# Patient Record
(demographics unavailable — no encounter records)

---

## 2024-12-19 NOTE — HISTORY OF PRESENT ILLNESS
[FreeTextEntry1] : eczema [de-identified] : 19F with a longstanding hx of eczema here for f/u. Has been on dupixent since 9/2021 with good control.  Uses alclometasone for breakthough disease, but last flare was 8/2023. has some conjunctivitis that see optho for, no drops prescribed. 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

## 2024-12-19 NOTE — PHYSICAL EXAM
[Alert] : alert [Oriented x 3] : ~L oriented x 3 [Well Nourished] : well nourished [Conjunctiva Non-injected] : conjunctiva non-injected [No Visual Lymphadenopathy] : no visual  lymphadenopathy [No Clubbing] : no clubbing [No Edema] : no edema [No Bromhidrosis] : no bromhidrosis [No Chromhidrosis] : no chromhidrosis [Declined] : declined [FreeTextEntry3] : skin clear of rash today

## 2025-05-15 NOTE — HISTORY OF PRESENT ILLNESS
[FreeTextEntry1] : eczema [de-identified] : 19F with a longstanding hx of eczema here for f/u. Has been on dupixent since 9/2021 with good control.  Uses alclometasone for breakthough disease, but last flare was 8/2023. has some conjunctivitis that see optho for, no drops prescribed. 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